# Patient Record
(demographics unavailable — no encounter records)

---

## 2024-11-08 NOTE — HISTORY OF PRESENT ILLNESS
Hospitalist Progress Note     Patient: Aakash Alvarez Service Date: 8/9/2021   YOB: 1988 Admission Date: 7/26/2021   MRN: [id number] Attending: Flory Ortega MD     Hospital Day: 15    Assessment/Plan:    Acute hypoxic respiratory failure and sepsis secondary to severe COVID pneumonia: Status post intubation 729  Elevated D-dimer: CT chest (-) For PE and US LE (-) for DVT  Acute combined heart failure/ Cardiomyopathy:  Suspect takotsubo with EF 44% on repeat echo:  Troponins within normal  Sinus bradycardia secondary to coreg  Hypotension: with addition of ace-I/lasix/coreg but more so after starting propofol 8/4 am: bps stable now  Thrombocytopenia secondary to sepsis he:  Stable  Leukocytosis due to above and steroids on board  Hypernatremia  Hyperglycemia likely steroid mediated:  No prior diagnosis of diabetes mellitus  Morbid obesity: Body mass index is 45.14 kg/m².    Lab/imaging reviewed  S/p 10 days of Remdesivir  Extubated 8/7 am    doing better with down trending O2 needs though desaturated overnight with sleeping. Obesity/SHUKRI contributing to hypoxia as well  Passed speech eval and eating well.  Monitor respiratory status closely. Pulmonary following. cpap to start tonight.  Continue steroid dosing and acyclovir ppx per ID status post Actemra 7/28.   Heparin drip for thromboprophylaxis and possible thromboembolism with elevated D-dimer. Monitor ptt. Not a good candidate for lovenox/eliquis given 145 kg wt.   Will start coumadin bridging given covid-19, obesity, poor mobility, cardiomyopathy  Coreg/losartan/lasix to continue as was started per cards this admission  COVID-19 isolation precautions.  Monitor glucose, labs per protocol/prn.   improved prognosis    Best Practices:  DVT PPX: heparin gtt    Disposition: continue icu care and if stable o2 on cpap tonight then transfer to floor tomorrow    Code status: Full Resuscitation    Patient is decisional: No          Plan d/w - Patient/family and  [FreeTextEntry1] : see flowshseet medical personnel. All questions and concerns addressed.      Hospital Day #: Hospital Day: 15      SUBJECTIVE:  Chief Complaint:  Sepsis from COVID-19 pneumonia, hypoxic respiratory failure, obesity, cardiomyopathy    Interval History :  Patient seen examined this morning.  33-year-old male admitted with sepsis secondary to COVID-19 pneumonia initially with mild acute hypoxia requiring 1 L of oxygen with elevated D-dimer.  Patient's condition worsened with worsening oxygen requirement with acute hypoxic respiratory failure leading to intubation and ICU transfer.  Echocardiogram with acute combined heart failure cardiomyopathy.    Desaturated when sleeping overnight but otherwise much improved oxygen requirement this morning on nasal canula.  No fevers.  Doing much better.  No chest pain, breathing issues.  No abdominal pains.    Allergies  ALLERGIES:  Patient has no known allergies.      Procedures/Diagnostics:  XR CHEST AP OR PA   Final Result   IMPRESSION:        Stable chest radiograph.         XR CHEST AP OR PA   Final Result   IMPRESSION:        Stable chest radiograph.         XR ABDOMEN AP KUB   Final Result   IMPRESSION:      1.  Opacities in the lung bases probably pneumonia. No Edi distention.      2.  Tip of nasogastric tube in the distal stomach      XR CHEST AP OR PA   Final Result   IMPRESSION:        Stable chest radiograph.         XR CHEST AP OR PA   Final Result   IMPRESSION:        Stable chest radiograph.         XR CHEST AP OR PA   Final Result   IMPRESSION:    Persistent patchy interstitial and airspace opacities. These are slightly   improved compared to previous studies. Consolidative changes in medial   right lung base to persist.   2. Mild cardiac enlargement. No pulmonary vascular congestion, no pleural   effusion or pneumothorax.         XR CHEST AP OR PA   Final Result   IMPRESSION:          1. Stable bilateral pneumonia.   2. Tubes and lines in place as described.         XR Tube  Check   Final Result   IMPRESSION:   1. Lines and tubes appear appropriate.   2. Diffuse bilateral pneumonia. Atypical infection is a distinct   possibility      US Lower Extremity Venous Duplex Bilat   Final Result   IMPRESSION:     1. No evidence of DVT is identified in the bilateral lower extremities.            TECHNOLOGIST: TYLOR            XR CHEST AP OR PA   Final Result   IMPRESSION:      1.  The severe diffuse bilateral consolidative opacities compatible with   COVID-19 pneumonia have not significantly changed.   2.  Interval placement of a right upper extremity PICC with tip in the low   superior vena cava.      IR PICC   Final Result   Non-reportable by Radiologist.      XR CHEST AP OR PA   Final Result   IMPRESSION:      Worsening interstitial and airspace opacities.      CT Angiogram Chest PE Imaging- 3D   Final Result   IMPRESSION:   1.  Moderate patchy consolidation in the lungs consistent with known Covid   19 pneumonia.   2.  No acute pulmonary embolus or lobar level.   3.  Dilated left ventricle. Consider further evaluation with   echocardiography.      XR Chest AP or PA   Final Result      XR Chest AP or PA    (Results Pending)       Labs  Recent Labs   Lab 08/09/21  0554 08/08/21  0426 08/07/21  0401 08/05/21  0607 08/04/21  0610 08/03/21  0437 08/03/21  0239   WBC 19.8* 19.8* 18.3*  --   --  11.9* 12.4*   HGB 14.8 14.1 13.8  --   --  11.4* 11.6*   HCT 48.8 46.1 45.4  --   --  39.0 38.9*    217 251  --   --  340 332   RBC 5.26 4.95 4.84  --   --  4.09* 4.09*   MCV 92.8 93.1 93.8  --   --  95.4 95.1   MCH 28.1 28.5 28.5  --   --  27.9 28.4   MCHC 30.3* 30.6* 30.4*  --   --  29.2* 29.8*   SODIUM  --   --   --   --  144 146*  --    CHLORIDE  --   --   --   --  113* 115*  --    BUN  --   --   --   --  39* 41*  --    CREATININE  --   --   --   --  0.86 0.82  --    CO2  --   --   --   --  26 28  --    POTASSIUM  --   --  4.1 4.1 4.0 4.3  --    BILIRUBIN  --   --   --   --  0.5 0.5  --    AST  --    --   --   --  12 11  --    ALBUMIN  --   --   --   --  2.8* 2.9*  --    ALKPT  --   --   --   --  49 50  --        CMP  Recent Labs   Lab 08/07/21 0401 08/05/21 0607 08/04/21 0610 08/03/21 0437   SODIUM  --   --  144 146*   CHLORIDE  --   --  113* 115*   BUN  --   --  39* 41*   GLUCOSE  --   --  122* 111*   POTASSIUM 4.1 4.1 4.0 4.3   CO2  --   --  26 28     Recent Labs   Lab 08/04/21 0610 08/03/21 0437   BILIRUBIN 0.5 0.5   AST 12 11   ALBUMIN 2.8* 2.9*   ALKPT 49 50   GPT 25 33       BMP  Recent Labs   Lab 08/07/21 0401 08/05/21 0607 08/04/21 0610 08/03/21 0437   SODIUM  --   --  144 146*   CHLORIDE  --   --  113* 115*   BUN  --   --  39* 41*   GLUCOSE  --   --  122* 111*   POTASSIUM 4.1 4.1 4.0 4.3   CO2  --   --  26 28   CREATININE  --   --  0.86 0.82   CALCIUM  --   --  8.0* 8.4       INR (no units)   Date Value   08/04/2021 1.1       I/O last 3 completed shifts:  In: 394 [P.O.:75; I.V.:319]  Out: 2250 [Urine:2250]  No intake/output data recorded.    Urinanalysis:    Lab Results   Component Value Date    USPG 1.020 01/11/2021    UWBC Large (A) 01/11/2021    URBC Large (A) 01/11/2021    UBILI Negative 01/11/2021    UPH 6.0 01/11/2021           PHYSICAL EXAMINATION:  VITAL SIGNS:    Visit Vitals  /76   Pulse 92   Temp 98.7 °F (37.1 °C) (Axillary)   Resp (!) 34   Ht 5' 11\" (1.803 m)   Wt (!) 146.8 kg   SpO2 95%   BMI 45.14 kg/m²     GENERAL APPEARANCE:  Sitting in chair, nontoxic, mildly tachypneic, answering all questions appropriately  HEENT: Anicteric Sclera, perrl, eomi  LUNGS: clear to auscultation b/l, non labored breathing.  HEART: S1, S2 regular rate/rhythm. No murmurs  ABDOMEN: Soft, Nontender, non distended. obesed  MUSCULOSKELETAL: No acute skeletal deformities. No peripheral edema, cynosis. DP and PT intact.  NEURO-PSYCH: aaox3, no focal neuro findings. Moving b/l UE and LE spontaneously.  SKIN: warm to touch.         Flory Ortega MD  8/9/2021 8:58 AM  [facility] - Hospitalist  [facility]  [facility]

## 2025-01-14 NOTE — HISTORY OF PRESENT ILLNESS
[Delivery Date: ___] : on [unfilled] [Girl] : baby is a girl [Infant's Name ___] : [unfilled] [___ Lbs] : [unfilled] lbs [Living at Home] : is currently living at home [Bottle Feeding] : bottle feeding [Breastfeeding] : currently nursing [Resumed Menses] : has resumed her menses [Resumed [place]] : has resumed intercourse [Complications:___] : complications include: [unfilled] [Postpartum Follow Up] : postpartum follow up [] : delivered by vaginal delivery [Intended Contraception] : the patient does not intended to use contraception postpartum [Awake] : awake [Alert] : alert [Acute Distress] : no acute distress [Mass] : no breast mass [Nipple Discharge] : no nipple discharge [Axillary LAD] : no axillary lymphadenopathy [Soft] : soft [Tender] : non tender [Distended] : not distended [Oriented x3] : oriented to person, place, and time [Depressed Mood] : not depressed [Flat Affect] : affect not flat [Normal] : external genitalia [Motion Tenderness] : there was no cervical motion tenderness [Tenderness] : nontender [Adnexa Tenderness] : were not tender [Doing Well] : is doing well [No Sign of Infection] : is showing no signs of infection [Excellent Pain Control] : has excellent pain control [None] : None [FreeTextEntry8] : This 32 yo P 2052  presents for PPV after vaginal delivery; feels well, voiding and stooling without issue, no desire for contraception. [de-identified] : Nl PPV [de-identified] : RTO prn or for annual in 4 months